# Patient Record
Sex: FEMALE | URBAN - NONMETROPOLITAN AREA
[De-identification: names, ages, dates, MRNs, and addresses within clinical notes are randomized per-mention and may not be internally consistent; named-entity substitution may affect disease eponyms.]

---

## 2021-06-18 ENCOUNTER — OFFICE VISIT (OUTPATIENT)
Dept: URBAN - NONMETROPOLITAN AREA CLINIC 12 | Facility: CLINIC | Age: 21
End: 2021-06-18
Payer: COMMERCIAL

## 2021-06-18 DIAGNOSIS — S05.01XA CORNEAL ABRASION OF RIGHT EYE, INITIAL ENCOUNTER: Primary | ICD-10-CM

## 2021-06-18 PROCEDURE — 92071 CONTACT LENS FITTING FOR TX: CPT | Performed by: OPTOMETRIST

## 2021-06-18 PROCEDURE — 92004 COMPRE OPH EXAM NEW PT 1/>: CPT | Performed by: OPTOMETRIST

## 2021-06-18 NOTE — IMPRESSION/PLAN
Impression: Corneal abrasion of right eye, initial encounter: S05. [id number]. Plan: No foreign body noted. BCL placed on eye. Will meet up tomorrow to remove.